# Patient Record
Sex: MALE | Race: WHITE | NOT HISPANIC OR LATINO | URBAN - METROPOLITAN AREA
[De-identification: names, ages, dates, MRNs, and addresses within clinical notes are randomized per-mention and may not be internally consistent; named-entity substitution may affect disease eponyms.]

---

## 2018-10-22 ENCOUNTER — OUTPATIENT (OUTPATIENT)
Dept: OUTPATIENT SERVICES | Facility: HOSPITAL | Age: 33
LOS: 1 days | End: 2018-10-22
Payer: COMMERCIAL

## 2018-10-22 DIAGNOSIS — M51.26 OTHER INTERVERTEBRAL DISC DISPLACEMENT, LUMBAR REGION: ICD-10-CM

## 2018-10-22 DIAGNOSIS — M50.20 OTHER CERVICAL DISC DISPLACEMENT, UNSPECIFIED CERVICAL REGION: ICD-10-CM

## 2018-10-22 PROCEDURE — 72070 X-RAY EXAM THORAC SPINE 2VWS: CPT | Mod: 26

## 2018-10-22 PROCEDURE — 72040 X-RAY EXAM NECK SPINE 2-3 VW: CPT | Mod: 26

## 2018-10-22 PROCEDURE — 72040 X-RAY EXAM NECK SPINE 2-3 VW: CPT

## 2018-10-22 PROCEDURE — 72070 X-RAY EXAM THORAC SPINE 2VWS: CPT

## 2021-04-06 ENCOUNTER — OUTPATIENT (OUTPATIENT)
Dept: OUTPATIENT SERVICES | Facility: HOSPITAL | Age: 36
LOS: 1 days | End: 2021-04-06
Payer: COMMERCIAL

## 2021-04-06 DIAGNOSIS — Z11.52 ENCOUNTER FOR SCREENING FOR COVID-19: ICD-10-CM

## 2021-04-06 LAB — SARS-COV-2 RNA SPEC QL NAA+PROBE: SIGNIFICANT CHANGE UP

## 2021-04-06 PROCEDURE — U0003: CPT

## 2021-04-06 PROCEDURE — C9803: CPT

## 2021-04-06 PROCEDURE — U0005: CPT

## 2023-06-19 ENCOUNTER — INPATIENT (INPATIENT)
Facility: HOSPITAL | Age: 38
LOS: 2 days | Discharge: HOME | DRG: 872 | End: 2023-06-22
Attending: SURGERY | Admitting: SURGERY
Payer: COMMERCIAL

## 2023-06-19 VITALS
OXYGEN SATURATION: 98 % | HEIGHT: 74 IN | HEART RATE: 87 BPM | DIASTOLIC BLOOD PRESSURE: 69 MMHG | WEIGHT: 190.04 LBS | TEMPERATURE: 101 F | SYSTOLIC BLOOD PRESSURE: 113 MMHG | RESPIRATION RATE: 18 BRPM

## 2023-06-19 DIAGNOSIS — R10.0 ACUTE ABDOMEN: ICD-10-CM

## 2023-06-19 LAB
ALBUMIN SERPL ELPH-MCNC: 4.6 G/DL — SIGNIFICANT CHANGE UP (ref 3.3–5)
ALP SERPL-CCNC: 60 U/L — SIGNIFICANT CHANGE UP (ref 40–120)
ALT FLD-CCNC: 20 U/L — SIGNIFICANT CHANGE UP (ref 10–45)
ANION GAP SERPL CALC-SCNC: 13 MMOL/L — SIGNIFICANT CHANGE UP (ref 5–17)
APPEARANCE UR: CLEAR — SIGNIFICANT CHANGE UP
APTT BLD: 30.4 SEC — SIGNIFICANT CHANGE UP (ref 27.5–35.5)
AST SERPL-CCNC: 18 U/L — SIGNIFICANT CHANGE UP (ref 10–40)
BACTERIA # UR AUTO: NEGATIVE — SIGNIFICANT CHANGE UP
BASOPHILS # BLD AUTO: 0 K/UL — SIGNIFICANT CHANGE UP (ref 0–0.2)
BASOPHILS NFR BLD AUTO: 0 % — SIGNIFICANT CHANGE UP (ref 0–2)
BILIRUB SERPL-MCNC: 1.1 MG/DL — SIGNIFICANT CHANGE UP (ref 0.2–1.2)
BILIRUB UR-MCNC: NEGATIVE — SIGNIFICANT CHANGE UP
BLD GP AB SCN SERPL QL: NEGATIVE — SIGNIFICANT CHANGE UP
BUN SERPL-MCNC: 14 MG/DL — SIGNIFICANT CHANGE UP (ref 7–23)
CALCIUM SERPL-MCNC: 9.9 MG/DL — SIGNIFICANT CHANGE UP (ref 8.4–10.5)
CHLORIDE SERPL-SCNC: 99 MMOL/L — SIGNIFICANT CHANGE UP (ref 96–108)
CO2 SERPL-SCNC: 24 MMOL/L — SIGNIFICANT CHANGE UP (ref 22–31)
COLOR SPEC: YELLOW — SIGNIFICANT CHANGE UP
CREAT SERPL-MCNC: 0.82 MG/DL — SIGNIFICANT CHANGE UP (ref 0.5–1.3)
D DIMER BLD IA.RAPID-MCNC: <150 NG/ML DDU — SIGNIFICANT CHANGE UP
DIFF PNL FLD: NEGATIVE — SIGNIFICANT CHANGE UP
EGFR: 114 ML/MIN/1.73M2 — SIGNIFICANT CHANGE UP
EOSINOPHIL # BLD AUTO: 0 K/UL — SIGNIFICANT CHANGE UP (ref 0–0.5)
EOSINOPHIL NFR BLD AUTO: 0 % — SIGNIFICANT CHANGE UP (ref 0–6)
EPI CELLS # UR: 1 /HPF — SIGNIFICANT CHANGE UP
GLUCOSE SERPL-MCNC: 112 MG/DL — HIGH (ref 70–99)
GLUCOSE UR QL: NEGATIVE — SIGNIFICANT CHANGE UP
HCT VFR BLD CALC: 43.3 % — SIGNIFICANT CHANGE UP (ref 39–50)
HGB BLD-MCNC: 14.8 G/DL — SIGNIFICANT CHANGE UP (ref 13–17)
HYALINE CASTS # UR AUTO: 2 /LPF — SIGNIFICANT CHANGE UP (ref 0–2)
INR BLD: 1.35 RATIO — HIGH (ref 0.88–1.16)
KETONES UR-MCNC: ABNORMAL
LACTATE BLDV-MCNC: 1.5 MMOL/L — SIGNIFICANT CHANGE UP (ref 0.5–2)
LEUKOCYTE ESTERASE UR-ACNC: NEGATIVE — SIGNIFICANT CHANGE UP
LIDOCAIN IGE QN: 15 U/L — SIGNIFICANT CHANGE UP (ref 7–60)
LYMPHOCYTES # BLD AUTO: 0.12 K/UL — LOW (ref 1–3.3)
LYMPHOCYTES # BLD AUTO: 0.9 % — LOW (ref 13–44)
MANUAL SMEAR VERIFICATION: SIGNIFICANT CHANGE UP
MCHC RBC-ENTMCNC: 31.1 PG — SIGNIFICANT CHANGE UP (ref 27–34)
MCHC RBC-ENTMCNC: 34.2 GM/DL — SIGNIFICANT CHANGE UP (ref 32–36)
MCV RBC AUTO: 91 FL — SIGNIFICANT CHANGE UP (ref 80–100)
MONOCYTES # BLD AUTO: 0.58 K/UL — SIGNIFICANT CHANGE UP (ref 0–0.9)
MONOCYTES NFR BLD AUTO: 4.4 % — SIGNIFICANT CHANGE UP (ref 2–14)
NEUTROPHILS # BLD AUTO: 12.44 K/UL — HIGH (ref 1.8–7.4)
NEUTROPHILS NFR BLD AUTO: 94.7 % — HIGH (ref 43–77)
NITRITE UR-MCNC: NEGATIVE — SIGNIFICANT CHANGE UP
PH UR: 6.5 — SIGNIFICANT CHANGE UP (ref 5–8)
PLAT MORPH BLD: NORMAL — SIGNIFICANT CHANGE UP
PLATELET # BLD AUTO: 177 K/UL — SIGNIFICANT CHANGE UP (ref 150–400)
POTASSIUM SERPL-MCNC: 4.1 MMOL/L — SIGNIFICANT CHANGE UP (ref 3.5–5.3)
POTASSIUM SERPL-SCNC: 4.1 MMOL/L — SIGNIFICANT CHANGE UP (ref 3.5–5.3)
PROT SERPL-MCNC: 7.7 G/DL — SIGNIFICANT CHANGE UP (ref 6–8.3)
PROT UR-MCNC: SIGNIFICANT CHANGE UP
PROTHROM AB SERPL-ACNC: 15.7 SEC — HIGH (ref 10.5–13.4)
RBC # BLD: 4.76 M/UL — SIGNIFICANT CHANGE UP (ref 4.2–5.8)
RBC # FLD: 11.1 % — SIGNIFICANT CHANGE UP (ref 10.3–14.5)
RBC BLD AUTO: NORMAL — SIGNIFICANT CHANGE UP
RBC CASTS # UR COMP ASSIST: 0 /HPF — SIGNIFICANT CHANGE UP (ref 0–4)
RH IG SCN BLD-IMP: POSITIVE — SIGNIFICANT CHANGE UP
RH IG SCN BLD-IMP: POSITIVE — SIGNIFICANT CHANGE UP
SODIUM SERPL-SCNC: 136 MMOL/L — SIGNIFICANT CHANGE UP (ref 135–145)
SP GR SPEC: 1.02 — SIGNIFICANT CHANGE UP (ref 1.01–1.02)
UROBILINOGEN FLD QL: NEGATIVE — SIGNIFICANT CHANGE UP
WBC # BLD: 13.14 K/UL — HIGH (ref 3.8–10.5)
WBC # FLD AUTO: 13.14 K/UL — HIGH (ref 3.8–10.5)
WBC UR QL: 1 /HPF — SIGNIFICANT CHANGE UP (ref 0–5)

## 2023-06-19 PROCEDURE — 99222 1ST HOSP IP/OBS MODERATE 55: CPT | Mod: GC

## 2023-06-19 PROCEDURE — 99285 EMERGENCY DEPT VISIT HI MDM: CPT

## 2023-06-19 PROCEDURE — 74177 CT ABD & PELVIS W/CONTRAST: CPT | Mod: 26,MA

## 2023-06-19 RX ORDER — SODIUM CHLORIDE 9 MG/ML
1000 INJECTION, SOLUTION INTRAVENOUS
Refills: 0 | Status: DISCONTINUED | OUTPATIENT
Start: 2023-06-19 | End: 2023-06-22

## 2023-06-19 RX ORDER — PIPERACILLIN AND TAZOBACTAM 4; .5 G/20ML; G/20ML
3.38 INJECTION, POWDER, LYOPHILIZED, FOR SOLUTION INTRAVENOUS ONCE
Refills: 0 | Status: COMPLETED | OUTPATIENT
Start: 2023-06-19 | End: 2023-06-19

## 2023-06-19 RX ORDER — ACETAMINOPHEN 500 MG
1000 TABLET ORAL ONCE
Refills: 0 | Status: COMPLETED | OUTPATIENT
Start: 2023-06-19 | End: 2023-06-19

## 2023-06-19 RX ORDER — ENOXAPARIN SODIUM 100 MG/ML
40 INJECTION SUBCUTANEOUS EVERY 24 HOURS
Refills: 0 | Status: DISCONTINUED | OUTPATIENT
Start: 2023-06-19 | End: 2023-06-22

## 2023-06-19 RX ORDER — SODIUM CHLORIDE 9 MG/ML
1000 INJECTION INTRAMUSCULAR; INTRAVENOUS; SUBCUTANEOUS ONCE
Refills: 0 | Status: COMPLETED | OUTPATIENT
Start: 2023-06-19 | End: 2023-06-19

## 2023-06-19 RX ORDER — PIPERACILLIN AND TAZOBACTAM 4; .5 G/20ML; G/20ML
3.38 INJECTION, POWDER, LYOPHILIZED, FOR SOLUTION INTRAVENOUS EVERY 8 HOURS
Refills: 0 | Status: DISCONTINUED | OUTPATIENT
Start: 2023-06-20 | End: 2023-06-22

## 2023-06-19 RX ORDER — ACETAMINOPHEN 500 MG
650 TABLET ORAL ONCE
Refills: 0 | Status: DISCONTINUED | OUTPATIENT
Start: 2023-06-19 | End: 2023-06-19

## 2023-06-19 RX ADMIN — SODIUM CHLORIDE 1000 MILLILITER(S): 9 INJECTION INTRAMUSCULAR; INTRAVENOUS; SUBCUTANEOUS at 17:17

## 2023-06-19 RX ADMIN — PIPERACILLIN AND TAZOBACTAM 200 GRAM(S): 4; .5 INJECTION, POWDER, LYOPHILIZED, FOR SOLUTION INTRAVENOUS at 21:05

## 2023-06-19 RX ADMIN — Medication 1000 MILLIGRAM(S): at 19:38

## 2023-06-19 RX ADMIN — SODIUM CHLORIDE 1000 MILLILITER(S): 9 INJECTION INTRAMUSCULAR; INTRAVENOUS; SUBCUTANEOUS at 19:38

## 2023-06-19 RX ADMIN — Medication 400 MILLIGRAM(S): at 17:18

## 2023-06-19 NOTE — ED ADULT NURSE NOTE - OBJECTIVE STATEMENT
39 y/o male coming to the ER with c/o LUQ pain. A&Ox4. Ambulatory. No significant PMH. Patient endorses 3 days of pain "under the left rib" and b/l lower back pain. Abdomen is soft, non distended, tender in the LUQ and LLQ. Patient denies chest pain, fever, chills, n/v/d, urinary symptoms, Blood in the stool/urine. Safety measures maintained. Bed in the lowest position. Call bell within reach.  Provider at the bedside. No acute distress noted or further complaints at this time.

## 2023-06-19 NOTE — ED PROVIDER NOTE - PROGRESS NOTE DETAILS
[place] PGY1 - signout - 38-year-old male without significant past medical history presents with left-sided abdominal pain.  Pending CTAP to rule out diverticulitis.  S/p IVF, Tylenol.  Lab work notable for WBC 13.14, D-dimer negative. spoke w/ radiology w/ pneumoperitoneum, spoke w/ sx. zosyn ordered, pt to be admitted prefers Dr. Chiang, Surgery evaluated patient and will be admitting under their service to Dr. Botello

## 2023-06-19 NOTE — H&P ADULT - ASSESSMENT
38M with no PMH/PSH presents with L-sided abdominal pain x 4 days, found to have mild pneumoperitoneum 2/2 perforation of an inflamed jejunal diverticulum. Pt hemodynamically stable, NT on exam    Plan:  - Admit to [facility] team surgery  - NPO, IVF resuscitation  - IV antibiotics with Zosyn  - serial abdominal exam  - discussed with attending, Dr. Antonio Burgess, PGY4  Green surgery  [id number]

## 2023-06-19 NOTE — ED ADULT NURSE NOTE - NSFALLUNIVINTERV_ED_ALL_ED
Bed/Stretcher in lowest position, wheels locked, appropriate side rails in place/Call bell, personal items and telephone in reach/Instruct patient to call for assistance before getting out of bed/chair/stretcher/Non-slip footwear applied when patient is off stretcher/[place] to call system/Physically safe environment - no spills, clutter or unnecessary equipment/Purposeful proactive rounding/Room/bathroom lighting operational, light cord in reach

## 2023-06-19 NOTE — H&P ADULT - ATTENDING COMMENTS
Pain is mild, same.  In NAD.  Abdomen soft, with point LUQ tenderness, ND.  Stable.  The team will reevaluate for clears for lunch.  Continue IV ABX.

## 2023-06-19 NOTE — ED PROVIDER NOTE - CLINICAL SUMMARY MEDICAL DECISION MAKING FREE TEXT BOX
38M without pmh presenting with 4d of lower abd pain. Pt febrile, with llq tenderness. Plan to cbc cmp lipase ua ucx ctap

## 2023-06-19 NOTE — ED PROVIDER NOTE - ATTENDING CONTRIBUTION TO CARE
38-year-old male with no past medical history presents to the emergency department with 4 to 5 days of intermittent left-sided abdominal pain.  Patient states that when it occurs it is severe and prevents him from breathing.  Patient has not taken anything for his pain he has no fevers no chills no nausea no vomiting he denies any home meds.  Patient with no hematuria no swelling in his testicles.  No prior abdominal surgeries    On exam patient is awake alert oriented x3 he has clear lungs to auscultation bilaterally he has soft abdomen with mild left lower quadrant abdominal tenderness.  Plan for blood work, imaging and reassessment possible nephrolithiasis versus diverticulitis.  Patient blood work reveals an elevated white blood cell count normal creatinine.

## 2023-06-19 NOTE — ED ADULT NURSE REASSESSMENT NOTE - NS ED NURSE REASSESS COMMENT FT1
Report received from MAXIMILIANO Baez. PT is resting comfortably in bed, breathing unlabored on room air, and speaking in complete sentences. Vital signs stable. Updated PT on plan of care. Awaiting CT. Safety and comfort maintained. Call bell within reach.

## 2023-06-19 NOTE — H&P ADULT - NSHPPHYSICALEXAM_GEN_ALL_CORE
Vital Signs Last 24 Hrs  T(C): 36.8 (19 Jun 2023 19:30), Max: 38.1 (19 Jun 2023 15:09)  T(F): 98.2 (19 Jun 2023 19:30), Max: 100.5 (19 Jun 2023 15:09)  HR: 76 (19 Jun 2023 19:30) (71 - 87)  BP: 116/65 (19 Jun 2023 19:30) (113/69 - 119/67)  BP(mean): 78 (19 Jun 2023 19:30) (78 - 82)  RR: 18 (19 Jun 2023 19:30) (16 - 18)  SpO2: 100% (19 Jun 2023 19:30) (98% - 100%)    Parameters below as of 19 Jun 2023 19:30  Patient On (Oxygen Delivery Method): room air    Exam:  Gen: NAD, resting in bed, alert and responding appropriately  Resp: Airway patent, non-labored respirations  Abd: Soft, ND, NT, no rebound or guarding  Ext: No edema, WWP  Neuro: AAOx3, no focal deficits

## 2023-06-19 NOTE — H&P ADULT - NSHPLABSRESULTS_GEN_ALL_CORE
LABS:                        14.8   13.14 )-----------( 177      ( 2023 17:14 )             43.3         136  |  99  |  14  ----------------------------<  112<H>  4.1   |  24  |  0.82    Ca    9.9      2023 17:14    TPro  7.7  /  Alb  4.6  /  TBili  1.1  /  DBili  x   /  AST  18  /  ALT  20  /  AlkPhos  60        Urinalysis Basic - ( 2023 17:14 )    Color: Yellow / Appearance: Clear / S.022 / pH: x  Gluc: x / Ketone: Small  / Bili: Negative / Urobili: Negative   Blood: x / Protein: Trace / Nitrite: Negative   Leuk Esterase: Negative / RBC: 0 /hpf / WBC 1 /HPF   Sq Epi: x / Non Sq Epi: x / Bacteria: Negative    < from: CT Abdomen and Pelvis w/ IV Cont (23 @ 20:11) >    LIVER: Cyst at the hepatic dome.  BILE DUCTS: Normal caliber.  GALLBLADDER: Within normal limits.  SPLEEN: Within normal limits.  PANCREAS: Within normal limits.  ADRENALS: Within normal limits.  KIDNEYS/URETERS: Within normal limits.    BLADDER: Within normal limits.  REPRODUCTIVE ORGANS: Prostate within normal limits.    BOWEL/PERITONEUM: Small volume pneumoperitoneum concentrated in the left   upper quadrant. Multiple jejunal diverticuli are noted with thickening of   the left upper quadrant diverticulum with adjacent mesenteric   inflammation (2:53, 601:103, and 602:35). No bowel obstruction.  VESSELS: Atherosclerotic changes.  RETROPERITONEUM/LYMPH NODES: No lymphadenopathy.  ABDOMINAL WALL: Within normal limits.  BONES: Within normal limits.    IMPRESSION:  Mild volume pneumoperitoneum. Suspect perforation of an inflamed jejunal   diverticulum.    < end of copied text >

## 2023-06-19 NOTE — H&P ADULT - HISTORY OF PRESENT ILLNESS
Patient is a 38y old  Male who presents with a chief complaint of abdominal pain    HPI:   38 year old male with no PMH/PSH presents with abdominal pain x 4 days. Report left-sided abdominal pain, intermittent, started 4 days ago, but worsen today to 10/10. But pain has improved since coming to ED. Denies nausea, vomiting, never had similar pain before. Unknown last flatus, likely earlier today, +BM today, normal. Denies fever at home. Never had colonoscopy, no history or family of IBD or colon cancer. Mother has ?IBS    In ED, patient febrile to 100.5F. Lab significant for leukocytosis of 13, lactate WNL. CT shows mild volume pneumoperitoneum suspect perforation of an inflamed jejunal diverticula   Patient is a 38y old  Male who presents with a chief complaint of abdominal pain    HPI:   38 year old male with no PMH/PSH presents with abdominal pain x 4 days. Report left-sided mid abdominal pain, intermittent, started 4 days ago, but worsen today to 10/10. But pain has significantly improved since coming to ED. Denies nausea, vomiting, never had similar pain before. Unknown last flatus, likely earlier today, +BM today, normal. Denies fever at home. Never had colonoscopy, no history or family of IBD or colon cancer. Mother has ?IBS    In ED, patient febrile to 100.5F. Lab significant for leukocytosis of 13, lactate WNL. CT shows mild volume pneumoperitoneum suspect perforation of an inflamed jejunal diverticula

## 2023-06-19 NOTE — ED PROVIDER NOTE - CARE PLAN
Principal Discharge DX:	Abdominal pain  Secondary Diagnosis:	Diverticulitis of intestine with perforation or abscess without bleeding   1

## 2023-06-19 NOTE — ED PROVIDER NOTE - OBJECTIVE STATEMENT
38yoM presenting with apprx 4 days of intermittent lower abdominal pain. Pt without significant PMH. States that pain is intermittent, episodes last apprx 10 min and pain at that time is 10/10. Denies radiation, testicular pain, n/v/d, dysuria. Endorses some lower back pain but states that is chronic.

## 2023-06-19 NOTE — ED PROVIDER NOTE - PHYSICAL EXAMINATION
PHYSICAL EXAM:  CONSTITUTIONAL: Well appearing, awake, alert, oriented to person, place, time/situation and in no apparent distress.  HEAD: Atraumatic  EYES: Clear bilaterally, pupils equal, round and reactive to light.  CARDIAC: Normal rate, regular rhythm. +S1/S2. No murmurs, rubs or gallops.  RESPIRATORY: Breathing unlabored. Breath sounds clear and equal bilaterally.  ABDOMEN:  soft nondistended, llq tenderness without rebound or guarding   SKIN: Skin warm and dry. No evidence of rashes or lesions.

## 2023-06-20 LAB
ANION GAP SERPL CALC-SCNC: 12 MMOL/L — SIGNIFICANT CHANGE UP (ref 5–17)
BUN SERPL-MCNC: 13 MG/DL — SIGNIFICANT CHANGE UP (ref 7–23)
CALCIUM SERPL-MCNC: 8.8 MG/DL — SIGNIFICANT CHANGE UP (ref 8.4–10.5)
CHLORIDE SERPL-SCNC: 105 MMOL/L — SIGNIFICANT CHANGE UP (ref 96–108)
CO2 SERPL-SCNC: 23 MMOL/L — SIGNIFICANT CHANGE UP (ref 22–31)
CREAT SERPL-MCNC: 0.9 MG/DL — SIGNIFICANT CHANGE UP (ref 0.5–1.3)
CULTURE RESULTS: SIGNIFICANT CHANGE UP
EGFR: 111 ML/MIN/1.73M2 — SIGNIFICANT CHANGE UP
GLUCOSE SERPL-MCNC: 103 MG/DL — HIGH (ref 70–99)
HCT VFR BLD CALC: 37.6 % — LOW (ref 39–50)
HGB BLD-MCNC: 12.5 G/DL — LOW (ref 13–17)
MAGNESIUM SERPL-MCNC: 2 MG/DL — SIGNIFICANT CHANGE UP (ref 1.6–2.6)
MCHC RBC-ENTMCNC: 30.7 PG — SIGNIFICANT CHANGE UP (ref 27–34)
MCHC RBC-ENTMCNC: 33.2 GM/DL — SIGNIFICANT CHANGE UP (ref 32–36)
MCV RBC AUTO: 92.4 FL — SIGNIFICANT CHANGE UP (ref 80–100)
NRBC # BLD: 0 /100 WBCS — SIGNIFICANT CHANGE UP (ref 0–0)
PHOSPHATE SERPL-MCNC: 3.4 MG/DL — SIGNIFICANT CHANGE UP (ref 2.5–4.5)
PLATELET # BLD AUTO: 158 K/UL — SIGNIFICANT CHANGE UP (ref 150–400)
POTASSIUM SERPL-MCNC: 4.4 MMOL/L — SIGNIFICANT CHANGE UP (ref 3.5–5.3)
POTASSIUM SERPL-SCNC: 4.4 MMOL/L — SIGNIFICANT CHANGE UP (ref 3.5–5.3)
RBC # BLD: 4.07 M/UL — LOW (ref 4.2–5.8)
RBC # FLD: 11.1 % — SIGNIFICANT CHANGE UP (ref 10.3–14.5)
SODIUM SERPL-SCNC: 140 MMOL/L — SIGNIFICANT CHANGE UP (ref 135–145)
SPECIMEN SOURCE: SIGNIFICANT CHANGE UP
WBC # BLD: 7.58 K/UL — SIGNIFICANT CHANGE UP (ref 3.8–10.5)
WBC # FLD AUTO: 7.58 K/UL — SIGNIFICANT CHANGE UP (ref 3.8–10.5)

## 2023-06-20 RX ORDER — ACETAMINOPHEN 500 MG
1000 TABLET ORAL ONCE
Refills: 0 | Status: DISCONTINUED | OUTPATIENT
Start: 2023-06-20 | End: 2023-06-22

## 2023-06-20 RX ORDER — ACETAMINOPHEN 500 MG
1000 TABLET ORAL ONCE
Refills: 0 | Status: COMPLETED | OUTPATIENT
Start: 2023-06-20 | End: 2023-06-20

## 2023-06-20 RX ADMIN — PIPERACILLIN AND TAZOBACTAM 3.38 GRAM(S): 4; .5 INJECTION, POWDER, LYOPHILIZED, FOR SOLUTION INTRAVENOUS at 00:06

## 2023-06-20 RX ADMIN — PIPERACILLIN AND TAZOBACTAM 25 GRAM(S): 4; .5 INJECTION, POWDER, LYOPHILIZED, FOR SOLUTION INTRAVENOUS at 21:36

## 2023-06-20 RX ADMIN — PIPERACILLIN AND TAZOBACTAM 25 GRAM(S): 4; .5 INJECTION, POWDER, LYOPHILIZED, FOR SOLUTION INTRAVENOUS at 12:33

## 2023-06-20 RX ADMIN — PIPERACILLIN AND TAZOBACTAM 25 GRAM(S): 4; .5 INJECTION, POWDER, LYOPHILIZED, FOR SOLUTION INTRAVENOUS at 06:45

## 2023-06-20 RX ADMIN — Medication 400 MILLIGRAM(S): at 01:47

## 2023-06-20 NOTE — PROGRESS NOTE ADULT - ASSESSMENT
38M with no PMH/PSH presents with L-sided abdominal pain x 4 days, found to have mild pneumoperitoneum 2/2 perforation of an inflamed jejunal diverticulum. Pt hemodynamically stable, NT on exam    Plan:  - Admit to [facility] team surgery  - NPO, IVF resuscitation  - IV antibiotics with Zosyn  - serial abdominal exam 38M with no PMH/PSH presents with L-sided abdominal pain x 4 days, found to have mild pneumoperitoneum 2/2 perforation of an inflamed jejunal diverticulum. Pt hemodynamically stable, NT on exam    Plan:  - Admit to Green team surgery  - NPO, IVF resuscitation  - adv to clears for lunch if improving  - IV antibiotics with Zosyn  - serial abdominal exam  - AM labs    [facility] Surgery [id number]

## 2023-06-20 NOTE — PROGRESS NOTE ADULT - SUBJECTIVE AND OBJECTIVE BOX
Surgery Daily Progress Note  =====================================================  SUBJECTIVE: A 38y Male Patient seen and examined at bedside on AM rounds.     ALLERGIES:  No Known Allergies      --------------------------------------------------------------------------------------    MEDICATIONS:    Neurologic Medications    Respiratory Medications    Cardiovascular Medications    Gastrointestinal Medications  lactated ringers. 1000 milliLiter(s) IV Continuous <Continuous>    Genitourinary Medications    Hematologic/Oncologic Medications  enoxaparin Injectable 40 milliGRAM(s) SubCutaneous every 24 hours    Antimicrobial/Immunologic Medications  piperacillin/tazobactam IVPB.. 3.375 Gram(s) IV Intermittent every 8 hours    Endocrine/Metabolic Medications    Topical/Other Medications    --------------------------------------------------------------------------------------    VITAL SIGNS:  No Known Allergies      T(C): 37 (23 @ 00:05), Max: 38.1 (23 @ 15:09)  HR: 83 (23 @ 00:05) (71 - 87)  BP: 112/68 (23 @ 00:05) (112/68 - 119/67)  RR: 18 (23 @ 00:05) (16 - 18)  SpO2: 100% (23 @ 00:05) (98% - 100%)  --------------------------------------------------------------------------------------    EXAM    General: NAD, resting in bed comfortably.  Cardiac: regular rate, warm and well perfused  Respiratory: Nonlabored respirations, normal cw expansion.  Abdomen:  Extremities: normal strength, FROM, no deformities    --------------------------------------------------------------------------------------    I&Os  T(C): 37 (23 @ 00:05), Max: 38.1 (23 @ 15:09)  HR: 83 (23 @ 00:05) (71 - 87)  BP: 112/68 (23 @ 00:05) (112/68 - 119/67)  RR: 18 (23 @ 00:05) (16 - 18)  SpO2: 100% (23 @ 00:05) (98% - 100%)  --------------------------------------------------------------------------------------    LABS                          14.8   13.14 )-----------( 177      ( 2023 17:14 )             43.3     -    136  |  99  |  14  ----------------------------<  112<H>  4.1   |  24  |  0.82    Ca    9.9      2023 17:14    TPro  7.7  /  Alb  4.6  /  TBili  1.1  /  DBili  x   /  AST  18  /  ALT  20  /  AlkPhos  60  06-19    PT/INR - ( 2023 22:07 )   PT: 15.7 sec;   INR: 1.35 ratio         PTT - ( 2023 22:07 )  PTT:30.4 sec  Urinalysis Basic - ( 2023 17:14 )    Color: Yellow / Appearance: Clear / S.022 / pH: x  Gluc: x / Ketone: Small  / Bili: Negative / Urobili: Negative   Blood: x / Protein: Trace / Nitrite: Negative   Leuk Esterase: Negative / RBC: 0 /hpf / WBC 1 /HPF   Sq Epi: x / Non Sq Epi: x / Bacteria: Negative        enoxaparin Injectable 40 milliGRAM(s) SubCutaneous every 24 hours  lactated ringers. 1000 milliLiter(s) IV Continuous <Continuous>  piperacillin/tazobactam IVPB.. 3.375 Gram(s) IV Intermittent every 8 hours      RADIOLOGY, EKG & ADDITIONAL TESTS: Reviewed.  Surgery Daily Progress Note  =====================================================  SUBJECTIVE: A 38y Male Patient seen and examined at bedside on AM rounds.     ALLERGIES:  No Known Allergies      --------------------------------------------------------------------------------------    MEDICATIONS:    Neurologic Medications    Respiratory Medications    Cardiovascular Medications    Gastrointestinal Medications  lactated ringers. 1000 milliLiter(s) IV Continuous <Continuous>    Genitourinary Medications    Hematologic/Oncologic Medications  enoxaparin Injectable 40 milliGRAM(s) SubCutaneous every 24 hours    Antimicrobial/Immunologic Medications  piperacillin/tazobactam IVPB.. 3.375 Gram(s) IV Intermittent every 8 hours    Endocrine/Metabolic Medications    Topical/Other Medications    --------------------------------------------------------------------------------------    VITAL SIGNS:  No Known Allergies      T(C): 37 (23 @ 00:05), Max: 38.1 (23 @ 15:09)  HR: 83 (23 @ 00:05) (71 - 87)  BP: 112/68 (23 @ 00:05) (112/68 - 119/67)  RR: 18 (23 @ 00:05) (16 - 18)  SpO2: 100% (23 @ 00:05) (98% - 100%)  --------------------------------------------------------------------------------------    EXAM    General: NAD, resting in bed comfortably.  Cardiac: regular rate, warm and well perfused  Respiratory: Nonlabored respirations, normal cw expansion.  Abdomen: soft, ND, NT, no rebound or guarding  Extremities: normal strength, FROM, no deformities    --------------------------------------------------------------------------------------    I&Os  T(C): 37 (23 @ 00:05), Max: 38.1 (23 @ 15:09)  HR: 83 (23 @ 00:05) (71 - 87)  BP: 112/68 (23 @ 00:05) (112/68 - 119/67)  RR: 18 (23 @ 00:05) (16 - 18)  SpO2: 100% (23 @ 00:05) (98% - 100%)  --------------------------------------------------------------------------------------    LABS                          14.8   13.14 )-----------( 177       17:14 )             43.3         136  |  99  |  14  ----------------------------<  112<H>  4.1   |  24  |  0.82    Ca    9.9      2023 17:14    TPro  7.7  /  Alb  4.6  /  TBili  1.1  /  DBili  x   /  AST  18  /  ALT  20  /  AlkPhos  60  06-19    PT/INR - ( 2023 22:07 )   PT: 15.7 sec;   INR: 1.35 ratio         PTT - ( 2023 22:07 )  PTT:30.4 sec  Urinalysis Basic - ( 2023 17:14 )    Color: Yellow / Appearance: Clear / S.022 / pH: x  Gluc: x / Ketone: Small  / Bili: Negative / Urobili: Negative   Blood: x / Protein: Trace / Nitrite: Negative   Leuk Esterase: Negative / RBC: 0 /hpf / WBC 1 /HPF   Sq Epi: x / Non Sq Epi: x / Bacteria: Negative        enoxaparin Injectable 40 milliGRAM(s) SubCutaneous every 24 hours  lactated ringers. 1000 milliLiter(s) IV Continuous <Continuous>  piperacillin/tazobactam IVPB.. 3.375 Gram(s) IV Intermittent every 8 hours      RADIOLOGY, EKG & ADDITIONAL TESTS: Reviewed.

## 2023-06-21 ENCOUNTER — TRANSCRIPTION ENCOUNTER (OUTPATIENT)
Age: 38
End: 2023-06-21

## 2023-06-21 VITALS
TEMPERATURE: 98 F | HEART RATE: 63 BPM | SYSTOLIC BLOOD PRESSURE: 110 MMHG | RESPIRATION RATE: 18 BRPM | OXYGEN SATURATION: 100 % | DIASTOLIC BLOOD PRESSURE: 71 MMHG

## 2023-06-21 PROBLEM — Z78.9 OTHER SPECIFIED HEALTH STATUS: Chronic | Status: ACTIVE | Noted: 2023-06-19

## 2023-06-21 LAB
ANION GAP SERPL CALC-SCNC: 13 MMOL/L — SIGNIFICANT CHANGE UP (ref 5–17)
BUN SERPL-MCNC: 9 MG/DL — SIGNIFICANT CHANGE UP (ref 7–23)
CALCIUM SERPL-MCNC: 8.9 MG/DL — SIGNIFICANT CHANGE UP (ref 8.4–10.5)
CHLORIDE SERPL-SCNC: 103 MMOL/L — SIGNIFICANT CHANGE UP (ref 96–108)
CO2 SERPL-SCNC: 27 MMOL/L — SIGNIFICANT CHANGE UP (ref 22–31)
CREAT SERPL-MCNC: 0.97 MG/DL — SIGNIFICANT CHANGE UP (ref 0.5–1.3)
EGFR: 101 ML/MIN/1.73M2 — SIGNIFICANT CHANGE UP
GLUCOSE SERPL-MCNC: 104 MG/DL — HIGH (ref 70–99)
HCT VFR BLD CALC: 38.8 % — LOW (ref 39–50)
HGB BLD-MCNC: 13 G/DL — SIGNIFICANT CHANGE UP (ref 13–17)
MAGNESIUM SERPL-MCNC: 2.1 MG/DL — SIGNIFICANT CHANGE UP (ref 1.6–2.6)
MCHC RBC-ENTMCNC: 31.1 PG — SIGNIFICANT CHANGE UP (ref 27–34)
MCHC RBC-ENTMCNC: 33.5 GM/DL — SIGNIFICANT CHANGE UP (ref 32–36)
MCV RBC AUTO: 92.8 FL — SIGNIFICANT CHANGE UP (ref 80–100)
NRBC # BLD: 0 /100 WBCS — SIGNIFICANT CHANGE UP (ref 0–0)
PHOSPHATE SERPL-MCNC: 3.1 MG/DL — SIGNIFICANT CHANGE UP (ref 2.5–4.5)
PLATELET # BLD AUTO: 176 K/UL — SIGNIFICANT CHANGE UP (ref 150–400)
POTASSIUM SERPL-MCNC: 4.1 MMOL/L — SIGNIFICANT CHANGE UP (ref 3.5–5.3)
POTASSIUM SERPL-SCNC: 4.1 MMOL/L — SIGNIFICANT CHANGE UP (ref 3.5–5.3)
RBC # BLD: 4.18 M/UL — LOW (ref 4.2–5.8)
RBC # FLD: 11.2 % — SIGNIFICANT CHANGE UP (ref 10.3–14.5)
SODIUM SERPL-SCNC: 143 MMOL/L — SIGNIFICANT CHANGE UP (ref 135–145)
WBC # BLD: 6.15 K/UL — SIGNIFICANT CHANGE UP (ref 3.8–10.5)
WBC # FLD AUTO: 6.15 K/UL — SIGNIFICANT CHANGE UP (ref 3.8–10.5)

## 2023-06-21 PROCEDURE — 96366 THER/PROPH/DIAG IV INF ADDON: CPT

## 2023-06-21 PROCEDURE — 85025 COMPLETE CBC W/AUTO DIFF WBC: CPT

## 2023-06-21 PROCEDURE — 87086 URINE CULTURE/COLONY COUNT: CPT

## 2023-06-21 PROCEDURE — 85730 THROMBOPLASTIN TIME PARTIAL: CPT

## 2023-06-21 PROCEDURE — 83735 ASSAY OF MAGNESIUM: CPT

## 2023-06-21 PROCEDURE — 83690 ASSAY OF LIPASE: CPT

## 2023-06-21 PROCEDURE — 83605 ASSAY OF LACTIC ACID: CPT

## 2023-06-21 PROCEDURE — 85027 COMPLETE CBC AUTOMATED: CPT

## 2023-06-21 PROCEDURE — 86850 RBC ANTIBODY SCREEN: CPT

## 2023-06-21 PROCEDURE — 84100 ASSAY OF PHOSPHORUS: CPT

## 2023-06-21 PROCEDURE — 86900 BLOOD TYPING SEROLOGIC ABO: CPT

## 2023-06-21 PROCEDURE — 86901 BLOOD TYPING SEROLOGIC RH(D): CPT

## 2023-06-21 PROCEDURE — 74177 CT ABD & PELVIS W/CONTRAST: CPT | Mod: MA

## 2023-06-21 PROCEDURE — 85610 PROTHROMBIN TIME: CPT

## 2023-06-21 PROCEDURE — 80053 COMPREHEN METABOLIC PANEL: CPT

## 2023-06-21 PROCEDURE — 96365 THER/PROPH/DIAG IV INF INIT: CPT

## 2023-06-21 PROCEDURE — 81001 URINALYSIS AUTO W/SCOPE: CPT

## 2023-06-21 PROCEDURE — 85379 FIBRIN DEGRADATION QUANT: CPT

## 2023-06-21 PROCEDURE — 99285 EMERGENCY DEPT VISIT HI MDM: CPT | Mod: 25

## 2023-06-21 PROCEDURE — 80048 BASIC METABOLIC PNL TOTAL CA: CPT

## 2023-06-21 RX ADMIN — SODIUM CHLORIDE 125 MILLILITER(S): 9 INJECTION, SOLUTION INTRAVENOUS at 02:21

## 2023-06-21 RX ADMIN — PIPERACILLIN AND TAZOBACTAM 25 GRAM(S): 4; .5 INJECTION, POWDER, LYOPHILIZED, FOR SOLUTION INTRAVENOUS at 12:18

## 2023-06-21 RX ADMIN — PIPERACILLIN AND TAZOBACTAM 25 GRAM(S): 4; .5 INJECTION, POWDER, LYOPHILIZED, FOR SOLUTION INTRAVENOUS at 06:09

## 2023-06-21 NOTE — DISCHARGE NOTE PROVIDER - NSDCCPCAREPLAN_GEN_ALL_CORE_FT
PRINCIPAL DISCHARGE DIAGNOSIS  Diagnosis: Diverticulitis of intestine with perforation or abscess without bleeding  Assessment and Plan of Treatment: low residue diet- avoid raw fruits and vegetables,  thoroughly cooked vegetables that are soft and easily mashed with a fork are ok to eat. Bananas are also ok to eat.  antibiotic - augmentin twice daily for 10 days   follow up with Dr. Alvarez in 1-2 weeks please call ot schedule an appointment   Please follow up with your regular medical doctor in 1 week, please call to schedule an appointment to discuss recent hospitalization  notify Dr. Alvarez if develop fever, chills, worsening abdominal pain, nausea, vomiting      SECONDARY DISCHARGE DIAGNOSES  Diagnosis: Diverticulitis of intestine with perforation or abscess without bleeding  Assessment and Plan of Treatment:

## 2023-06-21 NOTE — DISCHARGE NOTE PROVIDER - NSDCFUSCHEDAPPT_GEN_ALL_CORE_FT
Paty Alvarez  [facility]  [facility] [address]  Scheduled Appointment: 07/03/2023

## 2023-06-21 NOTE — DISCHARGE NOTE PROVIDER - HOSPITAL COURSE
38 year old male with no PMH/PSH presents with abdominal pain x 4 days. Report left-sided mid abdominal pain, intermittent, started 4 days ago, but worsen today to 10/10. But pain has significantly improved since coming to ED. Denies nausea, vomiting, never had similar pain before. Unknown last flatus, likely earlier today, +BM today, normal. Denies fever at home. Never had colonoscopy, no history or family of IBD or colon cancer. Mother has ?IBS    In ED, patient febrile to 100.5F. Lab significant for leukocytosis of 13, lactate WNL. CT shows mild volume pneumoperitoneum suspect perforation of an inflamed jejunal diverticula  Patient admitted NPO/IV Abx   Pain improved started on clears advanced to LRD.   Patient discharged home on 10 days of augmentin to follow up with Dr. Alvarez. 38 year old male with no PMH/PSH presents with abdominal pain x 4 days. Report left-sided mid abdominal pain, intermittent, started 4 days ago, but worsen today to 10/10. But pain has significantly improved since coming to ED. Denies nausea, vomiting, never had similar pain before. Unknown last flatus, likely earlier today, +BM today, normal. Denies fever at home. Never had colonoscopy, no history or family of IBD or colon cancer. Mother has ?IBS    In ED, patient febrile to 100.5F. Lab significant for leukocytosis of 13, lactate WNL. CT shows mild volume pneumoperitoneum suspect perforation of an inflamed jejunal diverticula. Sepsis present on admission due to perforated diverticulitis       Patient admitted NPO/IV Abx   Pain improved started on clears advanced to LRD.   Patient discharged home on 10 days of augmentin to follow up with Dr. Alvarez.

## 2023-06-21 NOTE — DISCHARGE NOTE NURSING/CASE MANAGEMENT/SOCIAL WORK - PATIENT PORTAL LINK FT
You can access the FollowMyHealth Patient Portal offered by [facility] by registering at the following website: [email or web address]. By joining [employer]’s FollowMyHealth portal, you will also be able to view your health information using other applications (apps) compatible with our system.

## 2023-06-21 NOTE — PROGRESS NOTE ADULT - REASON FOR ADMISSION
mild pneumoperitoneum 2/2 perf jejunal diverticulum
mild pneumoperitoneum 2/2 perf jejunal diverticulum

## 2023-06-21 NOTE — DISCHARGE NOTE PROVIDER - CARE PROVIDER_API CALL
Paty Alvarez  Colon/Rectal Surgery  [address] [place] [address], [address]  [place] [address]  Phone: [phone]  Fax: [phone]  Follow Up Time: 1 week

## 2023-06-21 NOTE — PROGRESS NOTE ADULT - SUBJECTIVE AND OBJECTIVE BOX
Surgery Daily Progress Note  =====================================================  SUBJECTIVE: A 38y Male Patient seen and examined at bedside on AM rounds.     ALLERGIES:  No Known Allergies      --------------------------------------------------------------------------------------    MEDICATIONS:    Neurologic Medications  acetaminophen   IVPB .. 1000 milliGRAM(s) IV Intermittent once    Respiratory Medications    Cardiovascular Medications    Gastrointestinal Medications  lactated ringers. 1000 milliLiter(s) IV Continuous <Continuous>    Genitourinary Medications    Hematologic/Oncologic Medications  enoxaparin Injectable 40 milliGRAM(s) SubCutaneous every 24 hours    Antimicrobial/Immunologic Medications  piperacillin/tazobactam IVPB.. 3.375 Gram(s) IV Intermittent every 8 hours    Endocrine/Metabolic Medications    Topical/Other Medications    --------------------------------------------------------------------------------------    VITAL SIGNS:  No Known Allergies      T(C): 37 (23 @ 21:23), Max: 37 (23 @ 17:52)  HR: 60 (23 @ 21:23) (56 - 65)  BP: 100/66 (23 @ 21:23) (95/62 - 112/68)  RR: 18 (23 @ 21:23) (18 - 18)  SpO2: 100% (23 @ 21:23) (98% - 100%)  --------------------------------------------------------------------------------------    EXAM    General: NAD, resting in bed comfortably.  Cardiac: regular rate, warm and well perfused  Respiratory: Nonlabored respirations, normal cw expansion.  Abdomen:   Extremities: normal strength, FROM, no deformities    --------------------------------------------------------------------------------------    I&Os  T(C): 37 (23 @ 21:23), Max: 37 (23 @ 17:52)  HR: 60 (23 @ 21:23) (56 - 65)  BP: 100/66 (23 @ 21:23) (95/62 - 112/68)  RR: 18 (23 @ 21:23) (18 - 18)  SpO2: 100% (23 @ 21:23) (98% - 100%)  --------------------------------------------------------------------------------------    LABS                          12.5   7.58  )-----------( 158      ( 2023 07:17 )             37.6     06-20    140  |  105  |  13  ----------------------------<  103<H>  4.4   |  23  |  0.90    Ca    8.8      2023 07:17  Phos  3.4     06-20  Mg     2.0     06-20    TPro  7.7  /  Alb  4.6  /  TBili  1.1  /  DBili  x   /  AST  18  /  ALT  20  /  AlkPhos  60  -    PT/INR - ( 2023 22:07 )   PT: 15.7 sec;   INR: 1.35 ratio         PTT - ( 2023 22:07 )  PTT:30.4 sec  Urinalysis Basic - ( 2023 17:14 )    Color: Yellow / Appearance: Clear / S.022 / pH: x  Gluc: x / Ketone: Small  / Bili: Negative / Urobili: Negative   Blood: x / Protein: Trace / Nitrite: Negative   Leuk Esterase: Negative / RBC: 0 /hpf / WBC 1 /HPF   Sq Epi: x / Non Sq Epi: x / Bacteria: Negative        23 @ 07:01  -  23 @ 00:23  --------------------------------------------------------  IN: 280 mL / OUT: 600 mL / NET: -320 mL      acetaminophen   IVPB .. 1000 milliGRAM(s) IV Intermittent once  enoxaparin Injectable 40 milliGRAM(s) SubCutaneous every 24 hours  lactated ringers. 1000 milliLiter(s) IV Continuous <Continuous>  piperacillin/tazobactam IVPB.. 3.375 Gram(s) IV Intermittent every 8 hours      RADIOLOGY, EKG & ADDITIONAL TESTS: Reviewed.

## 2023-06-21 NOTE — PROGRESS NOTE ADULT - ATTENDING COMMENTS
Had pain with clears.  Currently comfortable.  Abdomen soft, NT, ND.  Repeat trial of clears today. He had pain yesterday after taking large amount of clears. Much better today, currently comfortable.  Abdomen soft, NT, ND.  Trial of LRD today, advised for small portions.  D/c tonight/tomorrow with Augmentin x 10 days, office f/u in 10 days.

## 2023-06-21 NOTE — PROGRESS NOTE ADULT - ASSESSMENT
38M with no PMH/PSH presents with L-sided abdominal pain x 4 days, found to have mild pneumoperitoneum 2/2 perforation of an inflamed jejunal diverticulum. Pt hemodynamically stable, NT on exam    Plan:  - Admit to [facility] team surgery  - NPO, IVF resuscitation  - adv to clears for lunch if improving  - IV antibiotics with Zosyn  - serial abdominal exam  - AM labs    [facility] [id number]

## 2023-06-29 PROBLEM — Z00.00 ENCOUNTER FOR PREVENTIVE HEALTH EXAMINATION: Status: ACTIVE | Noted: 2023-06-29

## 2023-07-03 ENCOUNTER — APPOINTMENT (OUTPATIENT)
Dept: SURGERY | Facility: CLINIC | Age: 38
End: 2023-07-03

## 2023-07-19 ENCOUNTER — APPOINTMENT (OUTPATIENT)
Dept: CT IMAGING | Facility: IMAGING CENTER | Age: 38
End: 2023-07-19

## 2023-08-02 ENCOUNTER — RESULT REVIEW (OUTPATIENT)
Age: 38
End: 2023-08-02

## 2023-08-02 ENCOUNTER — OUTPATIENT (OUTPATIENT)
Dept: OUTPATIENT SERVICES | Facility: HOSPITAL | Age: 38
LOS: 1 days | End: 2023-08-02
Payer: COMMERCIAL

## 2023-08-02 ENCOUNTER — APPOINTMENT (OUTPATIENT)
Dept: CT IMAGING | Facility: IMAGING CENTER | Age: 38
End: 2023-08-02
Payer: COMMERCIAL

## 2023-08-02 DIAGNOSIS — Z00.8 ENCOUNTER FOR OTHER GENERAL EXAMINATION: ICD-10-CM

## 2023-08-02 PROCEDURE — 74177 CT ABD & PELVIS W/CONTRAST: CPT | Mod: 26

## 2023-08-02 PROCEDURE — 74177 CT ABD & PELVIS W/CONTRAST: CPT

## 2023-08-30 ENCOUNTER — APPOINTMENT (OUTPATIENT)
Dept: SURGERY | Facility: CLINIC | Age: 38
End: 2023-08-30
Payer: COMMERCIAL

## 2023-08-30 VITALS
RESPIRATION RATE: 16 BRPM | TEMPERATURE: 97.2 F | SYSTOLIC BLOOD PRESSURE: 116 MMHG | HEIGHT: 74 IN | WEIGHT: 190 LBS | HEART RATE: 76 BPM | OXYGEN SATURATION: 99 % | DIASTOLIC BLOOD PRESSURE: 78 MMHG | BODY MASS INDEX: 24.38 KG/M2

## 2023-08-30 DIAGNOSIS — Z78.9 OTHER SPECIFIED HEALTH STATUS: ICD-10-CM

## 2023-08-30 DIAGNOSIS — K57.00 DIVERTICULITIS OF SMALL INTESTINE WITH PERFORATION AND ABSCESS W/OUT BLEEDING: ICD-10-CM

## 2023-08-30 PROCEDURE — 99214 OFFICE O/P EST MOD 30 MIN: CPT

## 2023-09-09 PROBLEM — K57.00: Status: ACTIVE | Noted: 2023-09-09

## 2023-09-09 NOTE — REASON FOR VISIT
[Consultation] : a consultation visit [Post Hospitalization] : a post hospitalization visit [FreeTextEntry1] : SB diverticulitis

## 2023-09-09 NOTE — HISTORY OF PRESENT ILLNESS
[FreeTextEntry1] : Ivan is a 39 y/o male with recent episode of perforated SB diverticulitis, hospitalized at [facility] on 6/19/23 - 6/21/23.   CT A+P on 6/19/23 - Mild volume pneumoperitoneum. Suspect perforation of an inflamed jejunal diverticulum.  CT A+P on 8/2/23 - Unremarkable. Resolved pneumoperitoneum and mesenteric inflammatory changes.  Today pt reports no pain of abdomen. Hospitalized and was given IV antibiotics and sent home on oral antibiotics. Describes severe abd pain during the episode. Currently daily BMs, formed, no bleeding. Denies nausea and vomiting. Denies fever and chills. Good appetite. Not taking any anticoagulants. No abdominal sx h/o.   Denies family h/o diverticulitis.  Never had a colonoscopy.

## 2023-09-09 NOTE — CONSULT LETTER
[Dear  ___] : Dear ~ANOOP, [Courtesy Letter:] : I had the pleasure of seeing your patient, [unfilled], in my office today. [Sincerely,] : Sincerely, [Please see my note below.] : Please see my note below. [FreeTextEntry2] : Dr Kt Eng [FreeTextEntry3] : Paty Alvarez M.D., F.A.C.S., F.A.S.C.R.S. Assistant Professor of Surgery [facility] [employer] [facility]

## 2023-09-09 NOTE — ASSESSMENT
[FreeTextEntry1] : 37 yo male with recent episode of perforated SB diverticulitis. I advised for laparoscopic SB resection, poss open, and discussed all details, R/B/A of the procedure with the pt. I discussed our Enhanced Recovery Protocol and its advantages for faster recovery. All questions answered. The pt will d/w his family and decide whether he wishes to proceed.

## 2023-09-09 NOTE — PHYSICAL EXAM
[FreeTextEntry1] : This is a 38 year-old well-developed male in no apparent distress.  HEENT normocephalic, anicteric, external ears normal bilaterally, EOMs intact.  Cardiac - regular rate and rhythm.  Abdomen soft, nontender, nondistended, no masses. No hepatosplenomegaly.  No inguinal lymphadenopathy bilaterally.  Neuro-cranial nerves grossly intact. Normal gait.  Psychiatric-oriented to time place and person. Good understanding of conversation.